# Patient Record
Sex: MALE | Race: WHITE | URBAN - METROPOLITAN AREA
[De-identification: names, ages, dates, MRNs, and addresses within clinical notes are randomized per-mention and may not be internally consistent; named-entity substitution may affect disease eponyms.]

---

## 2023-02-15 ENCOUNTER — OFFICE VISIT (OUTPATIENT)
Dept: URBAN - METROPOLITAN AREA CLINIC 54 | Facility: CLINIC | Age: 78
End: 2023-02-15
Payer: MEDICARE

## 2023-02-15 ENCOUNTER — DASHBOARD ENCOUNTERS (OUTPATIENT)
Age: 78
End: 2023-02-15

## 2023-02-15 ENCOUNTER — WEB ENCOUNTER (OUTPATIENT)
Dept: URBAN - METROPOLITAN AREA CLINIC 54 | Facility: CLINIC | Age: 78
End: 2023-02-15

## 2023-02-15 VITALS
BODY MASS INDEX: 31.64 KG/M2 | HEART RATE: 84 BPM | SYSTOLIC BLOOD PRESSURE: 127 MMHG | WEIGHT: 221 LBS | HEIGHT: 70 IN | DIASTOLIC BLOOD PRESSURE: 83 MMHG | TEMPERATURE: 98 F

## 2023-02-15 DIAGNOSIS — K92.1 HEMATOCHEZIA: ICD-10-CM

## 2023-02-15 DIAGNOSIS — R19.7 DIARRHEA, UNSPECIFIED TYPE: ICD-10-CM

## 2023-02-15 PROCEDURE — 99213 OFFICE O/P EST LOW 20 MIN: CPT | Performed by: INTERNAL MEDICINE

## 2023-02-15 RX ORDER — SULINDAC 150 MG/1
1 TABLET WITH FOOD TABLET ORAL TWICE A DAY
Status: ACTIVE | COMMUNITY

## 2023-02-15 RX ORDER — LISINOPRIL 40 MG/1
TAKE 1 TABLET (20 MG) BY ORAL ROUTE ONCE DAILY TABLET ORAL 1
Qty: 0 | Refills: 0 | Status: ACTIVE | COMMUNITY
Start: 1900-01-01

## 2023-02-15 RX ORDER — ASCORBIC ACID 1000 MG
1 CAPSULE BEFORE BEDTIME AS NEEDED TABLET ORAL ONCE A DAY
Status: ACTIVE | COMMUNITY

## 2023-02-15 RX ORDER — HYDROCORTISONE ACETATE 0.5 %
AS DIRECTED CREAM (GRAM) TOPICAL
Status: ACTIVE | COMMUNITY

## 2023-02-15 RX ORDER — ASPIRIN 81 MG/1
CHEW 1 TABLET (81 MG) BY ORAL ROUTE ONCE DAILY TABLET, CHEWABLE ORAL 1
Qty: 0 | Refills: 0 | Status: ACTIVE | COMMUNITY
Start: 1900-01-01

## 2023-02-15 NOTE — HPI-TODAY'S VISIT:
He had 3 weeks of diarrhea occurring multiple times a day that has since resolved.  Since then he has been noticing intermittent bright red blood in the commode and when wiping.  He has no abdominal pain.  He has no indigestion or heartburn.  His last colonoscopy was in 2017 with removal of a small polyp and a 5-year recommendation for reexamination.  [facility]  His weight has been stable.  He has no cardiac or respiratory issues.

## 2023-02-27 ENCOUNTER — LAB OUTSIDE AN ENCOUNTER (OUTPATIENT)
Dept: URBAN - METROPOLITAN AREA CLINIC 54 | Facility: CLINIC | Age: 78
End: 2023-02-27

## 2023-03-03 LAB
C.DIFFICILE TOX A/B: NOT DETECTED
CAMPYLOBACTER: NOT DETECTED
COMMENT: (no result)
COMMENT: (no result)
CRYPTOSPORIDIUM: NOT DETECTED
E.COLI (ETEC): NOT DETECTED
E.COLI (STEC): NOT DETECTED
E.COLI O157: NOT DETECTED
GIARDIA LAMBLIA: NOT DETECTED
NOROVIRUS GI/GII: NOT DETECTED
ROTAVIRUS A: NOT DETECTED
SALMONELLA: NOT DETECTED
SHIGELLA: NOT DETECTED